# Patient Record
Sex: MALE | Race: WHITE | NOT HISPANIC OR LATINO | URBAN - METROPOLITAN AREA
[De-identification: names, ages, dates, MRNs, and addresses within clinical notes are randomized per-mention and may not be internally consistent; named-entity substitution may affect disease eponyms.]

---

## 2017-02-21 ENCOUNTER — EMERGENCY (EMERGENCY)
Facility: HOSPITAL | Age: 57
LOS: 1 days | Discharge: HOME | End: 2017-02-21
Attending: EMERGENCY MEDICINE | Admitting: EMERGENCY MEDICINE
Payer: COMMERCIAL

## 2017-02-21 VITALS
TEMPERATURE: 98 F | RESPIRATION RATE: 18 BRPM | WEIGHT: 154.98 LBS | SYSTOLIC BLOOD PRESSURE: 149 MMHG | HEART RATE: 75 BPM | DIASTOLIC BLOOD PRESSURE: 79 MMHG | OXYGEN SATURATION: 99 %

## 2017-02-21 DIAGNOSIS — R11.2 NAUSEA WITH VOMITING, UNSPECIFIED: ICD-10-CM

## 2017-02-21 DIAGNOSIS — R05 COUGH: ICD-10-CM

## 2017-02-21 LAB
ALBUMIN SERPL ELPH-MCNC: 3.9 G/DL — SIGNIFICANT CHANGE UP (ref 3.4–5)
ALP SERPL-CCNC: 42 U/L — SIGNIFICANT CHANGE UP (ref 40–120)
ALT FLD-CCNC: 21 U/L — SIGNIFICANT CHANGE UP (ref 12–42)
ANION GAP SERPL CALC-SCNC: 10 MMOL/L — SIGNIFICANT CHANGE UP (ref 9–16)
APPEARANCE UR: CLEAR — SIGNIFICANT CHANGE UP
AST SERPL-CCNC: 27 U/L — SIGNIFICANT CHANGE UP (ref 15–37)
BILIRUB SERPL-MCNC: 0.5 MG/DL — SIGNIFICANT CHANGE UP (ref 0.2–1.2)
BILIRUB UR-MCNC: NEGATIVE — SIGNIFICANT CHANGE UP
BUN SERPL-MCNC: 20 MG/DL — SIGNIFICANT CHANGE UP (ref 7–23)
CALCIUM SERPL-MCNC: 8.9 MG/DL — SIGNIFICANT CHANGE UP (ref 8.5–10.5)
CHLORIDE SERPL-SCNC: 99 MMOL/L — SIGNIFICANT CHANGE UP (ref 96–108)
CO2 SERPL-SCNC: 25 MMOL/L — SIGNIFICANT CHANGE UP (ref 22–31)
COLOR SPEC: YELLOW — SIGNIFICANT CHANGE UP
CREAT SERPL-MCNC: 0.95 MG/DL — SIGNIFICANT CHANGE UP (ref 0.5–1.3)
DIFF PNL FLD: NEGATIVE — SIGNIFICANT CHANGE UP
FLUAV SPEC QL CULT: NEGATIVE — SIGNIFICANT CHANGE UP
FLUBV AG SPEC QL IA: NEGATIVE — SIGNIFICANT CHANGE UP
GLUCOSE SERPL-MCNC: 109 MG/DL — HIGH (ref 70–99)
GLUCOSE UR QL: NEGATIVE — SIGNIFICANT CHANGE UP
HCT VFR BLD CALC: 38.6 % — LOW (ref 39–50)
HGB BLD-MCNC: 13.6 G/DL — SIGNIFICANT CHANGE UP (ref 13–17)
KETONES UR-MCNC: 15 MG/DL
LEUKOCYTE ESTERASE UR-ACNC: NEGATIVE — SIGNIFICANT CHANGE UP
LIDOCAIN IGE QN: 108 U/L — SIGNIFICANT CHANGE UP (ref 73–393)
MCHC RBC-ENTMCNC: 32.3 PG — SIGNIFICANT CHANGE UP (ref 27–34)
MCHC RBC-ENTMCNC: 35.2 G/DL — SIGNIFICANT CHANGE UP (ref 32–36)
MCV RBC AUTO: 91.7 FL — SIGNIFICANT CHANGE UP (ref 80–100)
NITRITE UR-MCNC: NEGATIVE — SIGNIFICANT CHANGE UP
PH UR: 5.5 — SIGNIFICANT CHANGE UP (ref 4–8.1)
PLATELET # BLD AUTO: 229 K/UL — SIGNIFICANT CHANGE UP (ref 150–400)
POTASSIUM SERPL-MCNC: 3.4 MMOL/L — LOW (ref 3.5–5.3)
POTASSIUM SERPL-SCNC: 3.4 MMOL/L — LOW (ref 3.5–5.3)
PROT SERPL-MCNC: 7.1 G/DL — SIGNIFICANT CHANGE UP (ref 6.4–8.2)
PROT UR-MCNC: NEGATIVE MG/DL — SIGNIFICANT CHANGE UP
RBC # BLD: 4.21 M/UL — SIGNIFICANT CHANGE UP (ref 4.2–5.8)
RBC # FLD: 11.7 % — SIGNIFICANT CHANGE UP (ref 10.3–16.9)
SODIUM SERPL-SCNC: 134 MMOL/L — SIGNIFICANT CHANGE UP (ref 132–145)
SP GR SPEC: 1.02 — SIGNIFICANT CHANGE UP (ref 1–1.03)
UROBILINOGEN FLD QL: 0.2 E.U./DL — SIGNIFICANT CHANGE UP
WBC # BLD: 10.5 K/UL — SIGNIFICANT CHANGE UP (ref 3.8–10.5)
WBC # FLD AUTO: 10.5 K/UL — SIGNIFICANT CHANGE UP (ref 3.8–10.5)

## 2017-02-21 PROCEDURE — 71020: CPT | Mod: 26

## 2017-02-21 PROCEDURE — 99284 EMERGENCY DEPT VISIT MOD MDM: CPT

## 2017-02-21 RX ORDER — ONDANSETRON 8 MG/1
4 TABLET, FILM COATED ORAL ONCE
Qty: 0 | Refills: 0 | Status: COMPLETED | OUTPATIENT
Start: 2017-02-21 | End: 2017-02-21

## 2017-02-21 RX ORDER — ACETAMINOPHEN 500 MG
650 TABLET ORAL ONCE
Qty: 0 | Refills: 0 | Status: COMPLETED | OUTPATIENT
Start: 2017-02-21 | End: 2017-02-21

## 2017-02-21 RX ORDER — SODIUM CHLORIDE 9 MG/ML
2000 INJECTION INTRAMUSCULAR; INTRAVENOUS; SUBCUTANEOUS ONCE
Qty: 0 | Refills: 0 | Status: COMPLETED | OUTPATIENT
Start: 2017-02-21 | End: 2017-02-21

## 2017-02-21 RX ADMIN — SODIUM CHLORIDE 2000 MILLILITER(S): 9 INJECTION INTRAMUSCULAR; INTRAVENOUS; SUBCUTANEOUS at 21:29

## 2017-02-21 RX ADMIN — ONDANSETRON 4 MILLIGRAM(S): 8 TABLET, FILM COATED ORAL at 21:27

## 2017-02-21 RX ADMIN — Medication 650 MILLIGRAM(S): at 23:46

## 2017-02-21 NOTE — ED ADULT NURSE NOTE - OBJECTIVE STATEMENT
pt is a 56y male, complaining of nausea and vomiting which started at 4pm today. pt is a 56y male, complaining of nausea and vomiting which started at 4pm today. Pt states 'I cannot keep anything down." Denies stomach pain, diarrhea, difficulty urinating and fever. Pt added, "I just feel dehydrated." No other complaints.

## 2017-02-21 NOTE — ED ADULT NURSE NOTE - CHPI ED SYMPTOMS NEG
no abdominal distension/no chills/no burning urination/no dysuria/no hematuria/no blood in stool/no diarrhea/no fever

## 2017-02-21 NOTE — ED PROVIDER NOTE - PROGRESS NOTE DETAILS
patient feeling better, tolerating PO, abdomen soft nontender, hydrated, urine with ketones, otherwise labs unremarkable, probable viral syndrome, will rx zofran prn, f/u PMD, strict return precautions reviewed.

## 2017-02-21 NOTE — ED ADULT TRIAGE NOTE - CHIEF COMPLAINT QUOTE
here for chills, nausea, vomiting and headache since 4pm today- pt also c/o cough, productive clear phlegm-

## 2017-02-21 NOTE — ED PROVIDER NOTE - OBJECTIVE STATEMENT
57 yo M with no pmhx presents c/o resolving "cold" - nonproductive cough, rhinorrhea x 1 week, had 3 episodes of vomiting today, c/o feeling "dehydrated" today. He denies abdominal pain, diarrhea. No fever, no chills. No chest pain or sob.

## 2017-02-21 NOTE — ED PROVIDER NOTE - MEDICAL DECISION MAKING DETAILS
vomiting with uri sx, nml vitals, unremarkable exam, will check labs, antiemetics, iv fluids, reassess.

## 2017-02-22 RX ORDER — POTASSIUM CHLORIDE 20 MEQ
20 PACKET (EA) ORAL ONCE
Qty: 0 | Refills: 0 | Status: COMPLETED | OUTPATIENT
Start: 2017-02-22 | End: 2017-02-22

## 2017-02-22 RX ORDER — ONDANSETRON 8 MG/1
1 TABLET, FILM COATED ORAL
Qty: 28 | Refills: 0
Start: 2017-02-22 | End: 2017-03-01

## 2017-02-22 RX ADMIN — Medication 650 MILLIGRAM(S): at 00:58

## 2017-02-22 RX ADMIN — Medication 20 MILLIEQUIVALENT(S): at 00:58

## 2019-04-15 ENCOUNTER — EMERGENCY (EMERGENCY)
Facility: HOSPITAL | Age: 59
LOS: 1 days | Discharge: HOME | End: 2019-04-15
Attending: EMERGENCY MEDICINE | Admitting: EMERGENCY MEDICINE
Payer: COMMERCIAL

## 2019-04-15 VITALS
DIASTOLIC BLOOD PRESSURE: 6 MMHG | OXYGEN SATURATION: 99 % | TEMPERATURE: 99 F | SYSTOLIC BLOOD PRESSURE: 125 MMHG | HEART RATE: 72 BPM | RESPIRATION RATE: 14 BRPM

## 2019-04-15 VITALS
DIASTOLIC BLOOD PRESSURE: 63 MMHG | TEMPERATURE: 99 F | OXYGEN SATURATION: 100 % | HEART RATE: 170 BPM | WEIGHT: 154.98 LBS | SYSTOLIC BLOOD PRESSURE: 103 MMHG | HEIGHT: 73 IN | RESPIRATION RATE: 18 BRPM

## 2019-04-15 DIAGNOSIS — I47.1 SUPRAVENTRICULAR TACHYCARDIA: ICD-10-CM

## 2019-04-15 DIAGNOSIS — Z79.899 OTHER LONG TERM (CURRENT) DRUG THERAPY: ICD-10-CM

## 2019-04-15 DIAGNOSIS — R00.2 PALPITATIONS: ICD-10-CM

## 2019-04-15 PROBLEM — Z00.00 ENCOUNTER FOR PREVENTIVE HEALTH EXAMINATION: Status: ACTIVE | Noted: 2019-04-15

## 2019-04-15 LAB
ALBUMIN SERPL ELPH-MCNC: 4.4 G/DL — SIGNIFICANT CHANGE UP (ref 3.4–5)
ALP SERPL-CCNC: 51 U/L — SIGNIFICANT CHANGE UP (ref 40–120)
ALT FLD-CCNC: 25 U/L — SIGNIFICANT CHANGE UP (ref 12–42)
ANION GAP SERPL CALC-SCNC: 9 MMOL/L — SIGNIFICANT CHANGE UP (ref 9–16)
APTT BLD: 31.3 SEC — SIGNIFICANT CHANGE UP (ref 27.5–36.3)
AST SERPL-CCNC: 31 U/L — SIGNIFICANT CHANGE UP (ref 15–37)
BASOPHILS NFR BLD AUTO: 0.3 % — SIGNIFICANT CHANGE UP (ref 0–2)
BILIRUB SERPL-MCNC: 0.9 MG/DL — SIGNIFICANT CHANGE UP (ref 0.2–1.2)
BUN SERPL-MCNC: 14 MG/DL — SIGNIFICANT CHANGE UP (ref 7–23)
CALCIUM SERPL-MCNC: 9.3 MG/DL — SIGNIFICANT CHANGE UP (ref 8.5–10.5)
CHLORIDE SERPL-SCNC: 100 MMOL/L — SIGNIFICANT CHANGE UP (ref 96–108)
CO2 SERPL-SCNC: 26 MMOL/L — SIGNIFICANT CHANGE UP (ref 22–31)
CREAT SERPL-MCNC: 1.12 MG/DL — SIGNIFICANT CHANGE UP (ref 0.5–1.3)
EOSINOPHIL NFR BLD AUTO: 0.5 % — SIGNIFICANT CHANGE UP (ref 0–6)
GLUCOSE SERPL-MCNC: 88 MG/DL — SIGNIFICANT CHANGE UP (ref 70–99)
HCT VFR BLD CALC: 44.5 % — SIGNIFICANT CHANGE UP (ref 39–50)
HGB BLD-MCNC: 15.2 G/DL — SIGNIFICANT CHANGE UP (ref 13–17)
IMM GRANULOCYTES NFR BLD AUTO: 0.2 % — SIGNIFICANT CHANGE UP (ref 0–1.5)
INR BLD: 1.15 — SIGNIFICANT CHANGE UP (ref 0.88–1.16)
LYMPHOCYTES # BLD AUTO: 21.3 % — SIGNIFICANT CHANGE UP (ref 13–44)
MAGNESIUM SERPL-MCNC: 2 MG/DL — SIGNIFICANT CHANGE UP (ref 1.6–2.6)
MCHC RBC-ENTMCNC: 32 PG — SIGNIFICANT CHANGE UP (ref 27–34)
MCHC RBC-ENTMCNC: 34.2 G/DL — SIGNIFICANT CHANGE UP (ref 32–36)
MCV RBC AUTO: 93.7 FL — SIGNIFICANT CHANGE UP (ref 80–100)
MONOCYTES NFR BLD AUTO: 6.7 % — SIGNIFICANT CHANGE UP (ref 2–14)
NEUTROPHILS NFR BLD AUTO: 71 % — SIGNIFICANT CHANGE UP (ref 43–77)
NT-PROBNP SERPL-SCNC: 67 PG/ML — SIGNIFICANT CHANGE UP
PHOSPHATE SERPL-MCNC: 2.7 MG/DL — SIGNIFICANT CHANGE UP (ref 2.5–4.5)
PLATELET # BLD AUTO: 198 K/UL — SIGNIFICANT CHANGE UP (ref 150–400)
POTASSIUM SERPL-MCNC: 4 MMOL/L — SIGNIFICANT CHANGE UP (ref 3.5–5.3)
POTASSIUM SERPL-SCNC: 4 MMOL/L — SIGNIFICANT CHANGE UP (ref 3.5–5.3)
PROT SERPL-MCNC: 7.6 G/DL — SIGNIFICANT CHANGE UP (ref 6.4–8.2)
PROTHROM AB SERPL-ACNC: 12.7 SEC — SIGNIFICANT CHANGE UP (ref 10–12.9)
RBC # BLD: 4.75 M/UL — SIGNIFICANT CHANGE UP (ref 4.2–5.8)
RBC # FLD: 12.4 % — SIGNIFICANT CHANGE UP (ref 10.3–14.5)
SODIUM SERPL-SCNC: 135 MMOL/L — SIGNIFICANT CHANGE UP (ref 132–145)
TROPONIN I SERPL-MCNC: <0.017 NG/ML — LOW (ref 0.02–0.06)
TSH SERPL-MCNC: 3.19 UIU/ML — SIGNIFICANT CHANGE UP (ref 0.36–3.74)
WBC # BLD: 9.4 K/UL — SIGNIFICANT CHANGE UP (ref 3.8–10.5)
WBC # FLD AUTO: 9.4 K/UL — SIGNIFICANT CHANGE UP (ref 3.8–10.5)

## 2019-04-15 PROCEDURE — 71045 X-RAY EXAM CHEST 1 VIEW: CPT | Mod: 26

## 2019-04-15 PROCEDURE — 99291 CRITICAL CARE FIRST HOUR: CPT

## 2019-04-15 PROCEDURE — 93010 ELECTROCARDIOGRAM REPORT: CPT | Mod: 59

## 2019-04-15 RX ORDER — SODIUM CHLORIDE 9 MG/ML
1000 INJECTION INTRAMUSCULAR; INTRAVENOUS; SUBCUTANEOUS ONCE
Qty: 0 | Refills: 0 | Status: COMPLETED | OUTPATIENT
Start: 2019-04-15 | End: 2019-04-15

## 2019-04-15 RX ORDER — ADENOSINE 3 MG/ML
6 INJECTION INTRAVENOUS ONCE
Qty: 0 | Refills: 0 | Status: COMPLETED | OUTPATIENT
Start: 2019-04-15 | End: 2019-04-15

## 2019-04-15 RX ADMIN — ADENOSINE 6 MILLIGRAM(S): 3 INJECTION INTRAVENOUS at 12:45

## 2019-04-15 RX ADMIN — SODIUM CHLORIDE 1000 MILLILITER(S): 9 INJECTION INTRAMUSCULAR; INTRAVENOUS; SUBCUTANEOUS at 13:26

## 2019-04-15 NOTE — ED ADULT TRIAGE NOTE - CHIEF COMPLAINT QUOTE
c/o sudden onset of palpitations and chest pressure x 1.5 hours after 30 min of cardio. denies CP, numbness/tinging. denies PMHX or cardiac hx. EKG in progress. c/o sudden onset of palpitations and chest pressure x 1.5 hours after 30 min of cardio at gym. denies CP, numbness/tinging. denies PMHX or cardiac hx. EKG in progress.

## 2019-04-15 NOTE — ED ADULT NURSE NOTE - CHIEF COMPLAINT QUOTE
c/o sudden onset of palpitations and chest pressure x 1.5 hours after 30 min of cardio at gym. denies CP, numbness/tinging. denies PMHX or cardiac hx. EKG in progress.

## 2019-04-15 NOTE — ED PROVIDER NOTE - PROGRESS NOTE DETAILS
Pt was given Adenosine, which brought HR down to 95. Currently, pt's heart rate has been at high 70's and low 80's. Denies any discomfort. Pt was seen by Cardiologist, Dr. Cameron Middleton and will follow up outpatient tomorrow.

## 2019-04-15 NOTE — ED PROVIDER NOTE - OBJECTIVE STATEMENT
57yo m w/ no significant pmhx presents with palpitations, HR 170s, which started about 1 hours ago. Pt was working out this morning and symptoms started about 30mns after workout. Denies any chest pain, fever, chills, nausea vomiting, no recent travel anywhere, no calf pain or swelling, no hx of DVT. pt denies any hx of SVT, or any other arrhythmia, no cardiac hx.

## 2019-04-15 NOTE — ED PROVIDER NOTE - NSFOLLOWUPINSTRUCTIONS_ED_ALL_ED_FT
Please follow up with cardiologist, Dr. Cameron Middleton tomorrow, as instructed  Please return to the ER for any new or worsening symptoms.

## 2019-04-15 NOTE — ED ADULT NURSE NOTE - NSIMPLEMENTINTERV_GEN_ALL_ED
Implemented All Universal Safety Interventions:  [place] to call system. Call bell, personal items and telephone within reach. Instruct patient to call for assistance. Room bathroom lighting operational. Non-slip footwear when patient is off stretcher. Physically safe environment: no spills, clutter or unnecessary equipment. Stretcher in lowest position, wheels locked, appropriate side rails in place.

## 2019-04-15 NOTE — ED PROVIDER NOTE - CLINICAL SUMMARY MEDICAL DECISION MAKING FREE TEXT BOX
59yo m w/ no significant pmhx presents with palpitations, HR 170s, which started about 1 hours ago. Pt was working out this morning and symptoms started about 30mns after workout. Denies any chest pain, fever, chills, nausea vomiting, no recent travel anywhere, no calf pain or swelling, no hx of DVT. pt denies any hx of SVT, or any other arrhythmia, no cardiac hx. PE was remarkable for HR in the 170's  - Adenosine, EKG, CXR and labs

## 2019-04-15 NOTE — ED PROVIDER NOTE - ATTENDING CONTRIBUTION TO CARE
Patient presenting with new diagnosis of SVT. Given 6mg IV adenosine, converted to NSR, normal rate. Patient with negative work up. Seen by Cardiology. Patient safe for discharge, follow up outpatient with Dr. CONNER belcher

## 2019-04-15 NOTE — ED PROVIDER NOTE - CRITICAL CARE PROVIDED
documentation/additional history taking/direct patient care (not related to procedure)/interpretation of diagnostic studies/consultation with other physicians/conducted a detailed discussion of DNR status

## 2019-04-16 ENCOUNTER — APPOINTMENT (OUTPATIENT)
Dept: HEART AND VASCULAR | Facility: CLINIC | Age: 59
End: 2019-04-16
Payer: COMMERCIAL

## 2019-04-16 VITALS
WEIGHT: 156 LBS | HEART RATE: 64 BPM | OXYGEN SATURATION: 96 % | DIASTOLIC BLOOD PRESSURE: 75 MMHG | SYSTOLIC BLOOD PRESSURE: 129 MMHG | HEIGHT: 71.5 IN | BODY MASS INDEX: 21.36 KG/M2 | TEMPERATURE: 97.8 F

## 2019-04-16 DIAGNOSIS — Z78.9 OTHER SPECIFIED HEALTH STATUS: ICD-10-CM

## 2019-04-16 DIAGNOSIS — Z82.49 FAMILY HISTORY OF ISCHEMIC HEART DISEASE AND OTHER DISEASES OF THE CIRCULATORY SYSTEM: ICD-10-CM

## 2019-04-16 PROCEDURE — 99204 OFFICE O/P NEW MOD 45 MIN: CPT

## 2019-04-16 RX ORDER — LORAZEPAM 1 MG/1
1 TABLET ORAL
Qty: 60 | Refills: 0 | Status: ACTIVE | COMMUNITY
Start: 2019-02-08

## 2019-04-16 RX ORDER — FINASTERIDE 5 MG/1
5 TABLET, FILM COATED ORAL
Qty: 30 | Refills: 0 | Status: ACTIVE | COMMUNITY
Start: 2018-09-26

## 2019-04-16 NOTE — REASON FOR VISIT
[Initial Evaluation] : an initial evaluation of [Supraventricular Tachycardia] : supraventricular tachycardia [FreeTextEntry1] : 59yo m w/ no significant pmhx presents with palpitations, HR 170s, which started about 1 hours prior to presentation. Pt was working out as he usually does in the morning and symptoms started about 30mns after workout. Denies any chest pain fever, chills, nausea vomiting, no recent travel anywhere, no calf pain or welling, no hx of DVT. pt denies any hx of SVT, or any other arrhythmia, no\par cardiac hx.\par \par SVT noted on ekg. Terminated with adenosine 6 mg x 1. Doing better today.

## 2019-04-24 ENCOUNTER — APPOINTMENT (OUTPATIENT)
Dept: HEART AND VASCULAR | Facility: CLINIC | Age: 59
End: 2019-04-24
Payer: COMMERCIAL

## 2019-04-24 PROCEDURE — 93306 TTE W/DOPPLER COMPLETE: CPT

## 2019-04-25 ENCOUNTER — APPOINTMENT (OUTPATIENT)
Dept: HEART AND VASCULAR | Facility: CLINIC | Age: 59
End: 2019-04-25
Payer: COMMERCIAL

## 2019-04-25 ENCOUNTER — NON-APPOINTMENT (OUTPATIENT)
Age: 59
End: 2019-04-25

## 2019-04-25 VITALS
SYSTOLIC BLOOD PRESSURE: 121 MMHG | DIASTOLIC BLOOD PRESSURE: 53 MMHG | WEIGHT: 156 LBS | HEIGHT: 71 IN | HEART RATE: 73 BPM | BODY MASS INDEX: 21.84 KG/M2

## 2019-04-25 DIAGNOSIS — J30.2 OTHER SEASONAL ALLERGIC RHINITIS: ICD-10-CM

## 2019-04-25 PROCEDURE — 99205 OFFICE O/P NEW HI 60 MIN: CPT | Mod: 25

## 2019-04-25 PROCEDURE — 93000 ELECTROCARDIOGRAM COMPLETE: CPT

## 2019-04-25 NOTE — PHYSICAL EXAM
[Normal Appearance] : normal appearance [General Appearance - Well Developed] : well developed [Well Groomed] : well groomed [General Appearance - Well Nourished] : well nourished [No Deformities] : no deformities [General Appearance - In No Acute Distress] : no acute distress [Normal Conjunctiva] : the conjunctiva exhibited no abnormalities [Eyelids - No Xanthelasma] : the eyelids demonstrated no xanthelasmas [Normal Oral Mucosa] : normal oral mucosa [No Oral Cyanosis] : no oral cyanosis [No Oral Pallor] : no oral pallor [Normal Jugular Venous A Waves Present] : normal jugular venous A waves present [Normal Jugular Venous V Waves Present] : normal jugular venous V waves present [No Jugular Venous Alarcon A Waves] : no jugular venous alarcon A waves [Heart Rate And Rhythm] : heart rate and rhythm were normal [Murmurs] : no murmurs present [Heart Sounds] : normal S1 and S2 [Arterial Pulses Normal] : the arterial pulses were normal [Exaggerated Use Of Accessory Muscles For Inspiration] : no accessory muscle use [Respiration, Rhythm And Depth] : normal respiratory rhythm and effort [Auscultation Breath Sounds / Voice Sounds] : lungs were clear to auscultation bilaterally [Abdomen Tenderness] : non-tender [Abdomen Soft] : soft [Abdomen Mass (___ Cm)] : no abdominal mass palpated [Abnormal Walk] : normal gait [Gait - Sufficient For Exercise Testing] : the gait was sufficient for exercise testing [] : no rash [No Venous Stasis] : no venous stasis [Skin Color & Pigmentation] : normal skin color and pigmentation [Skin Lesions] : no skin lesions [No Skin Ulcers] : no skin ulcer [No Xanthoma] : no  xanthoma was observed [Oriented To Time, Place, And Person] : oriented to person, place, and time [Affect] : the affect was normal [Mood] : the mood was normal [No Anxiety] : not feeling anxious

## 2019-04-25 NOTE — REASON FOR VISIT
[Initial Evaluation] : an initial evaluation of [Supraventricular Tachycardia] : supraventricular tachycardia [FreeTextEntry1] : 58 y.o. M with no significant medical problems who presented to [facility] 4/15/19 with palpitations and was found to have adenosine sensitive SVT who presents for an initial evaluation.\par \par Pt reports that this was his first ever episode of palpitations. It began shortly after completing a work-out. He went back to work, noted rapid heart beats that persisted for 20 min and called a cab to take him to the ED. Upon arrival to the ED he was found to have SVT at 165 bpm. Denies associated sob, c/p, lightheadedness and diaphoresis. He received 6mg adenosine which terminated the rhythm.\par \par Of note, patient planning to leave for a solo bicycle trip next week in [place]. \par \par Labs from ED normal. Normal electrolytes, TSH, no troponin leak. 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 \par \par Echo normal (report from Dr. Middleton pending)     \par \par  \par \par

## 2019-04-29 ENCOUNTER — APPOINTMENT (OUTPATIENT)
Dept: HEART AND VASCULAR | Facility: CLINIC | Age: 59
End: 2019-04-29
Payer: COMMERCIAL

## 2019-04-29 VITALS
SYSTOLIC BLOOD PRESSURE: 114 MMHG | BODY MASS INDEX: 21.84 KG/M2 | DIASTOLIC BLOOD PRESSURE: 64 MMHG | WEIGHT: 156 LBS | HEIGHT: 71 IN | OXYGEN SATURATION: 98 % | HEART RATE: 75 BPM

## 2019-04-29 PROCEDURE — 99214 OFFICE O/P EST MOD 30 MIN: CPT

## 2019-04-29 NOTE — PHYSICAL EXAM
[General Appearance - Well Developed] : well developed [Normal Appearance] : normal appearance [Well Groomed] : well groomed [General Appearance - Well Nourished] : well nourished [No Deformities] : no deformities [General Appearance - In No Acute Distress] : no acute distress [Normal Conjunctiva] : the conjunctiva exhibited no abnormalities [Eyelids - No Xanthelasma] : the eyelids demonstrated no xanthelasmas [Normal Oral Mucosa] : normal oral mucosa [No Oral Pallor] : no oral pallor [No Oral Cyanosis] : no oral cyanosis [Normal Jugular Venous A Waves Present] : normal jugular venous A waves present [Normal Jugular Venous V Waves Present] : normal jugular venous V waves present [No Jugular Venous Alarcon A Waves] : no jugular venous alarcon A waves [Respiration, Rhythm And Depth] : normal respiratory rhythm and effort [Exaggerated Use Of Accessory Muscles For Inspiration] : no accessory muscle use [Auscultation Breath Sounds / Voice Sounds] : lungs were clear to auscultation bilaterally [Abdomen Soft] : soft [Abdomen Tenderness] : non-tender [Abdomen Mass (___ Cm)] : no abdominal mass palpated [Abnormal Walk] : normal gait [Gait - Sufficient For Exercise Testing] : the gait was sufficient for exercise testing [Nail Clubbing] : no clubbing of the fingernails [Cyanosis, Localized] : no localized cyanosis [Petechial Hemorrhages (___cm)] : no petechial hemorrhages [Skin Color & Pigmentation] : normal skin color and pigmentation [] : no rash [No Venous Stasis] : no venous stasis [Skin Lesions] : no skin lesions [No Skin Ulcers] : no skin ulcer [No Xanthoma] : no  xanthoma was observed [Oriented To Time, Place, And Person] : oriented to person, place, and time [Affect] : the affect was normal [Mood] : the mood was normal [No Anxiety] : not feeling anxious

## 2019-04-29 NOTE — DISCUSSION/SUMMARY
[FreeTextEntry1] : ordering a stress echo as part of evaluation for his SVT. THe goal is to further risk stratify him and clear him for upcoming travel

## 2019-04-29 NOTE — REASON FOR VISIT
[Follow-Up - Clinic] : a clinic follow-up of [Supraventricular Tachycardia] : supraventricular tachycardia [FreeTextEntry1] : Mr. IVORY presents for a follow up today. He denies chest pain, dyspnea or palpitations. No issues reported with his medications. Otherwise, he is feeling well.\par \par Scheduled to travel. Was seen by EP. Tentative procedure sometime this summer for SVT evaluation.

## 2019-05-20 ENCOUNTER — APPOINTMENT (OUTPATIENT)
Dept: HEART AND VASCULAR | Facility: CLINIC | Age: 59
End: 2019-05-20
Payer: COMMERCIAL

## 2019-05-20 PROCEDURE — 93015 CV STRESS TEST SUPVJ I&R: CPT

## 2019-06-04 ENCOUNTER — NON-APPOINTMENT (OUTPATIENT)
Age: 59
End: 2019-06-04

## 2019-06-04 ENCOUNTER — APPOINTMENT (OUTPATIENT)
Dept: HEART AND VASCULAR | Facility: CLINIC | Age: 59
End: 2019-06-04
Payer: COMMERCIAL

## 2019-06-04 VITALS — HEART RATE: 63 BPM | SYSTOLIC BLOOD PRESSURE: 144 MMHG | DIASTOLIC BLOOD PRESSURE: 66 MMHG

## 2019-06-04 PROCEDURE — 93000 ELECTROCARDIOGRAM COMPLETE: CPT

## 2019-06-04 PROCEDURE — 99214 OFFICE O/P EST MOD 30 MIN: CPT | Mod: 25

## 2019-06-05 NOTE — PHYSICAL EXAM
[General Appearance - Well Developed] : well developed [Normal Appearance] : normal appearance [Well Groomed] : well groomed [General Appearance - Well Nourished] : well nourished [No Deformities] : no deformities [General Appearance - In No Acute Distress] : no acute distress [Normal Conjunctiva] : the conjunctiva exhibited no abnormalities [Eyelids - No Xanthelasma] : the eyelids demonstrated no xanthelasmas [Normal Oral Mucosa] : normal oral mucosa [No Oral Pallor] : no oral pallor [No Oral Cyanosis] : no oral cyanosis [Normal Jugular Venous A Waves Present] : normal jugular venous A waves present [Normal Jugular Venous V Waves Present] : normal jugular venous V waves present [No Jugular Venous Alarcon A Waves] : no jugular venous alarcon A waves [Respiration, Rhythm And Depth] : normal respiratory rhythm and effort [Exaggerated Use Of Accessory Muscles For Inspiration] : no accessory muscle use [Auscultation Breath Sounds / Voice Sounds] : lungs were clear to auscultation bilaterally [Heart Rate And Rhythm] : heart rate and rhythm were normal [Heart Sounds] : normal S1 and S2 [Murmurs] : no murmurs present [Arterial Pulses Normal] : the arterial pulses were normal [Abdomen Soft] : soft [Abdomen Tenderness] : non-tender [Abdomen Mass (___ Cm)] : no abdominal mass palpated [Abnormal Walk] : normal gait [Gait - Sufficient For Exercise Testing] : the gait was sufficient for exercise testing [Skin Color & Pigmentation] : normal skin color and pigmentation [] : no rash [No Venous Stasis] : no venous stasis [Skin Lesions] : no skin lesions [No Skin Ulcers] : no skin ulcer [No Xanthoma] : no  xanthoma was observed [Oriented To Time, Place, And Person] : oriented to person, place, and time [Affect] : the affect was normal [Mood] : the mood was normal [No Anxiety] : not feeling anxious

## 2019-06-05 NOTE — REASON FOR VISIT
[Follow-Up - Clinic] : a clinic follow-up of [Supraventricular Tachycardia] : supraventricular tachycardia [FreeTextEntry1] : 58 y.o. M with no significant medical problems who presented to [facility] 4/15/19 with palpitations and was found to have adenosine sensitive SVT.  \par \par Pt reports that this was his first ever episode of palpitations. It began shortly after completing a work-out. He went back to work, noted rapid heart beats that persisted for 20 min and called a cab to take him to the ED. Upon arrival to the ED he was found to have SVT at 165 bpm. Denies associated sob, c/p, lightheadedness and diaphoresis. He received 6mg adenosine which terminated the rhythm.  Labs from ED normal. Normal electrolytes, TSH, no troponin leak.  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  Echo normal.\par \par  He presents today to discuss the merits of catheter based ablation.  He denies any recurrent events.

## 2019-06-17 ENCOUNTER — OUTPATIENT (OUTPATIENT)
Dept: OUTPATIENT SERVICES | Facility: HOSPITAL | Age: 59
LOS: 1 days | Discharge: HOME | End: 2019-06-17
Payer: COMMERCIAL

## 2019-06-17 DIAGNOSIS — I47.1 SUPRAVENTRICULAR TACHYCARDIA: ICD-10-CM

## 2019-06-17 PROCEDURE — 93613 INTRACARDIAC EPHYS 3D MAPG: CPT

## 2019-06-17 PROCEDURE — C1730: CPT

## 2019-06-17 PROCEDURE — C1893: CPT

## 2019-06-17 PROCEDURE — 93653 COMPRE EP EVAL TX SVT: CPT

## 2019-06-17 PROCEDURE — C1733: CPT

## 2019-06-17 PROCEDURE — 93623 PRGRMD STIMJ&PACG IV RX NFS: CPT | Mod: 26

## 2019-06-17 PROCEDURE — C1894: CPT

## 2019-06-17 PROCEDURE — 93621 COMP EP EVL L PAC&REC C SINS: CPT

## 2019-06-17 PROCEDURE — 93623 PRGRMD STIMJ&PACG IV RX NFS: CPT

## 2019-06-17 PROCEDURE — 93621 COMP EP EVL L PAC&REC C SINS: CPT | Mod: 26

## 2019-06-17 RX ORDER — FINASTERIDE 5 MG/1
1 TABLET, FILM COATED ORAL
Qty: 0 | Refills: 0 | DISCHARGE

## 2019-06-17 RX ORDER — LORATADINE 10 MG/1
10 TABLET ORAL ONCE
Refills: 0 | Status: DISCONTINUED | OUTPATIENT
Start: 2019-06-17 | End: 2019-06-17

## 2019-06-17 RX ORDER — FINASTERIDE 5 MG/1
5 TABLET, FILM COATED ORAL ONCE
Refills: 0 | Status: DISCONTINUED | OUTPATIENT
Start: 2019-06-17 | End: 2019-06-17

## 2019-06-17 RX ORDER — CETIRIZINE HYDROCHLORIDE 10 MG/1
1 TABLET ORAL
Qty: 0 | Refills: 0 | DISCHARGE

## 2019-06-17 NOTE — H&P ADULT - ASSESSMENT
57 yo male with pmh of SVT and seasonal allergies presents to [facility] for scheduled SVT ablation today.

## 2019-06-17 NOTE — H&P ADULT - PROBLEM SELECTOR PLAN 1
Admitted for scheduled SVT ablation today. Initially diagnosed on 4/15/19 and presented to [facility] with palpitations and was found to have adenosine sensitive SVT. EKG EB rate 58, incomplete RBBB.   -Consent signed and in chart  -pre/post orders placed  -BR x 5 hours   -Plan for discharge home tomorrow if medically stable  -F/u with Dr. Hamilton in 4 weeks post op

## 2019-06-17 NOTE — H&P ADULT - HISTORY OF PRESENT ILLNESS
59 yo male with pmh of SVT and seasonal allergies presents to [facility] for scheduled SVT ablation today. In April, pt was initially diagnosed with SVT and received Adenosine in the ED, which terminated the rhythm. 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 Pt states having a "racing heart" during that time. Denies chest pain, SOB, lightheadedness, syncope or diaphoresis. Pt has remained asymptomatic since the initial episode. Currently, denies cp, SOB, lightheadedness, dizziness, diaphoresis, or syncope.

## 2019-06-17 NOTE — H&P ADULT - NSHPPHYSICALEXAM_GEN_ALL_CORE
General: A/ox 3, No acute Distress  Neck: Supple, NO JVD  Cardiac: S1 S2, No M/R/G  Pulmonary: CTAB, Breathing unlabored, No Rhonchi/Rales/Wheezing  Abdomen: Soft, Non -tender, +BS x 4 quads  Extremities: No Rashes, No edema  Neuro: A/o x 3, No focal deficits

## 2019-06-17 NOTE — CHART NOTE - NSCHARTNOTEFT_GEN_A_CORE
Given s/o by EP team patient was suppose to arrive to [facility]. On sunrise patient was discharged and checked all [facility] rooms, did not find patient. Attempted to call EP lab with no answer. Called Cath lab holding - stated patient was not there. [facility] said they were called saying the patient was discharged. No discharge order placed and no discharge note written. Attempted to call patient's cell phone multiple times overnight with no answer. LINCOLN Ornelas informed and called the manager of EP and they said that the patient was discharged. Will call EP team and inform them in the AM.

## 2019-06-17 NOTE — H&P ADULT - NSHPLABSRESULTS_GEN_ALL_CORE
Echo: 4/24/19: Left ventricular ejection fraction, EF 55-60%; normal left ventricular size and wall thickness, with normal systolic and diastolic function. Structurally normal mitral valve, with normal leaflet excursion. Normal trileaflet aortic valve with normal opening.    EKG: SB rate 58, incomplete RBBB    Labs: 4/2019    Na 135  K 4.0  BUN/Cr 14/1.12  Ca 9.3  glucose 88  TSH 3.18  Mag 2.0    CBC:  WBC: 9.4  H/H 15.2/44.5  Plt 198    INR1.15  PT12.7  BNP 67  Trop <0.017

## 2019-06-18 PROBLEM — Z86.79 PERSONAL HISTORY OF OTHER DISEASES OF THE CIRCULATORY SYSTEM: Chronic | Status: ACTIVE | Noted: 2019-06-17

## 2019-06-18 PROBLEM — J30.2 OTHER SEASONAL ALLERGIC RHINITIS: Chronic | Status: ACTIVE | Noted: 2019-06-17

## 2019-06-19 ENCOUNTER — APPOINTMENT (OUTPATIENT)
Dept: HEART AND VASCULAR | Facility: CLINIC | Age: 59
End: 2019-06-19
Payer: COMMERCIAL

## 2019-06-19 ENCOUNTER — NON-APPOINTMENT (OUTPATIENT)
Age: 59
End: 2019-06-19

## 2019-06-19 VITALS
SYSTOLIC BLOOD PRESSURE: 129 MMHG | HEIGHT: 71 IN | WEIGHT: 158 LBS | OXYGEN SATURATION: 96 % | BODY MASS INDEX: 22.12 KG/M2 | DIASTOLIC BLOOD PRESSURE: 70 MMHG | HEART RATE: 61 BPM | TEMPERATURE: 97.8 F

## 2019-06-19 PROCEDURE — 99214 OFFICE O/P EST MOD 30 MIN: CPT

## 2019-06-19 NOTE — PHYSICAL EXAM
[General Appearance - Well Developed] : well developed [Normal Appearance] : normal appearance [Well Groomed] : well groomed [General Appearance - Well Nourished] : well nourished [No Deformities] : no deformities [General Appearance - In No Acute Distress] : no acute distress [Eyelids - No Xanthelasma] : the eyelids demonstrated no xanthelasmas [Normal Conjunctiva] : the conjunctiva exhibited no abnormalities [No Oral Pallor] : no oral pallor [Normal Oral Mucosa] : normal oral mucosa [No Oral Cyanosis] : no oral cyanosis [Normal Jugular Venous A Waves Present] : normal jugular venous A waves present [Normal Jugular Venous V Waves Present] : normal jugular venous V waves present [Respiration, Rhythm And Depth] : normal respiratory rhythm and effort [No Jugular Venous Alarcon A Waves] : no jugular venous alarcon A waves [Exaggerated Use Of Accessory Muscles For Inspiration] : no accessory muscle use [Auscultation Breath Sounds / Voice Sounds] : lungs were clear to auscultation bilaterally [Abdomen Tenderness] : non-tender [Abdomen Soft] : soft [Abdomen Mass (___ Cm)] : no abdominal mass palpated [Abnormal Walk] : normal gait [Gait - Sufficient For Exercise Testing] : the gait was sufficient for exercise testing [Nail Clubbing] : no clubbing of the fingernails [Cyanosis, Localized] : no localized cyanosis [Petechial Hemorrhages (___cm)] : no petechial hemorrhages [Skin Color & Pigmentation] : normal skin color and pigmentation [] : no rash [No Skin Ulcers] : no skin ulcer [No Venous Stasis] : no venous stasis [Skin Lesions] : no skin lesions [No Xanthoma] : no  xanthoma was observed [Oriented To Time, Place, And Person] : oriented to person, place, and time [Affect] : the affect was normal [No Anxiety] : not feeling anxious [Mood] : the mood was normal

## 2019-06-19 NOTE — REASON FOR VISIT
[Follow-Up - Clinic] : a clinic follow-up of [Palpitations] : palpitations [Supraventricular Tachycardia] : supraventricular tachycardia [FreeTextEntry1] : Mr. IVROY presents for a follow up today. He denies chest pain, dyspnea or palpitations. No issues reported with his medications. Otherwise, he is feeling well.\par \par Presents after SVT procedure with Dr Hamilton. Doing well.

## 2019-07-09 ENCOUNTER — NON-APPOINTMENT (OUTPATIENT)
Age: 59
End: 2019-07-09

## 2019-07-09 ENCOUNTER — APPOINTMENT (OUTPATIENT)
Dept: HEART AND VASCULAR | Facility: CLINIC | Age: 59
End: 2019-07-09
Payer: COMMERCIAL

## 2019-07-09 VITALS
BODY MASS INDEX: 22.12 KG/M2 | HEIGHT: 71 IN | WEIGHT: 158 LBS | SYSTOLIC BLOOD PRESSURE: 126 MMHG | HEART RATE: 70 BPM | DIASTOLIC BLOOD PRESSURE: 71 MMHG

## 2019-07-09 PROCEDURE — 93000 ELECTROCARDIOGRAM COMPLETE: CPT

## 2019-07-09 PROCEDURE — 99213 OFFICE O/P EST LOW 20 MIN: CPT

## 2019-07-09 NOTE — HISTORY OF PRESENT ILLNESS
[FreeTextEntry1] : 58 y.o. 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  He feels well and has not had recurrent symptoms.  He is exercising and doing things that previously "launched him" into arrhythmia.\par \par Groins ok.\par \par The patient denies chest pain, SOB, [place], palpitation, light headedness, syncope or change in exertional capacity.\par

## 2019-07-09 NOTE — PHYSICAL EXAM
[Arterial Pulses Normal] : the arterial pulses were normal [General Appearance - Well Developed] : well developed [Normal Appearance] : normal appearance [Well Groomed] : well groomed [General Appearance - Well Nourished] : well nourished [No Deformities] : no deformities [General Appearance - In No Acute Distress] : no acute distress [Normal Conjunctiva] : the conjunctiva exhibited no abnormalities [Eyelids - No Xanthelasma] : the eyelids demonstrated no xanthelasmas [Normal Oral Mucosa] : normal oral mucosa [No Oral Pallor] : no oral pallor [No Oral Cyanosis] : no oral cyanosis [Normal Jugular Venous A Waves Present] : normal jugular venous A waves present [Normal Jugular Venous V Waves Present] : normal jugular venous V waves present [No Jugular Venous Alarcon A Waves] : no jugular venous alarcon A waves [Respiration, Rhythm And Depth] : normal respiratory rhythm and effort [Exaggerated Use Of Accessory Muscles For Inspiration] : no accessory muscle use [Auscultation Breath Sounds / Voice Sounds] : lungs were clear to auscultation bilaterally [Heart Rate And Rhythm] : heart rate and rhythm were normal [Heart Sounds] : normal S1 and S2 [Murmurs] : no murmurs present [Abdomen Soft] : soft [Abdomen Tenderness] : non-tender [Abdomen Mass (___ Cm)] : no abdominal mass palpated [Abnormal Walk] : normal gait [Gait - Sufficient For Exercise Testing] : the gait was sufficient for exercise testing [Nail Clubbing] : no clubbing of the fingernails [Cyanosis, Localized] : no localized cyanosis [Petechial Hemorrhages (___cm)] : no petechial hemorrhages [Skin Color & Pigmentation] : normal skin color and pigmentation [] : no rash [No Venous Stasis] : no venous stasis [Skin Lesions] : no skin lesions [No Skin Ulcers] : no skin ulcer [No Xanthoma] : no  xanthoma was observed [Oriented To Time, Place, And Person] : oriented to person, place, and time [Affect] : the affect was normal [Mood] : the mood was normal [No Anxiety] : not feeling anxious

## 2019-09-12 ENCOUNTER — APPOINTMENT (OUTPATIENT)
Dept: HEART AND VASCULAR | Facility: CLINIC | Age: 59
End: 2019-09-12

## 2020-06-20 ENCOUNTER — TRANSCRIPTION ENCOUNTER (OUTPATIENT)
Age: 60
End: 2020-06-20

## 2020-06-22 ENCOUNTER — TRANSCRIPTION ENCOUNTER (OUTPATIENT)
Age: 60
End: 2020-06-22

## 2020-08-02 ENCOUNTER — TRANSCRIPTION ENCOUNTER (OUTPATIENT)
Age: 60
End: 2020-08-02

## 2020-09-02 ENCOUNTER — TRANSCRIPTION ENCOUNTER (OUTPATIENT)
Age: 60
End: 2020-09-02

## 2020-09-19 ENCOUNTER — TRANSCRIPTION ENCOUNTER (OUTPATIENT)
Age: 60
End: 2020-09-19

## 2020-10-05 ENCOUNTER — TRANSCRIPTION ENCOUNTER (OUTPATIENT)
Age: 60
End: 2020-10-05

## 2020-10-24 ENCOUNTER — TRANSCRIPTION ENCOUNTER (OUTPATIENT)
Age: 60
End: 2020-10-24

## 2020-11-18 ENCOUNTER — TRANSCRIPTION ENCOUNTER (OUTPATIENT)
Age: 60
End: 2020-11-18

## 2021-06-11 ENCOUNTER — TRANSCRIPTION ENCOUNTER (OUTPATIENT)
Age: 61
End: 2021-06-11

## 2021-10-19 ENCOUNTER — TRANSCRIPTION ENCOUNTER (OUTPATIENT)
Age: 61
End: 2021-10-19

## 2022-01-12 ENCOUNTER — APPOINTMENT (OUTPATIENT)
Dept: CT IMAGING | Facility: CLINIC | Age: 62
End: 2022-01-12
Payer: SELF-PAY

## 2022-01-12 ENCOUNTER — OUTPATIENT (OUTPATIENT)
Dept: OUTPATIENT SERVICES | Facility: HOSPITAL | Age: 62
LOS: 1 days | End: 2022-01-12

## 2022-01-12 PROCEDURE — 75571 CT HRT W/O DYE W/CA TEST: CPT | Mod: 26

## 2022-04-17 ENCOUNTER — NON-APPOINTMENT (OUTPATIENT)
Age: 62
End: 2022-04-17

## 2022-04-18 ENCOUNTER — NON-APPOINTMENT (OUTPATIENT)
Age: 62
End: 2022-04-18

## 2022-04-18 ENCOUNTER — APPOINTMENT (OUTPATIENT)
Dept: HEART AND VASCULAR | Facility: CLINIC | Age: 62
End: 2022-04-18
Payer: COMMERCIAL

## 2022-04-18 VITALS
HEIGHT: 73 IN | OXYGEN SATURATION: 99 % | DIASTOLIC BLOOD PRESSURE: 63 MMHG | SYSTOLIC BLOOD PRESSURE: 110 MMHG | BODY MASS INDEX: 20.41 KG/M2 | HEART RATE: 78 BPM | WEIGHT: 154 LBS

## 2022-04-18 PROCEDURE — 99214 OFFICE O/P EST MOD 30 MIN: CPT

## 2022-04-18 PROCEDURE — 93000 ELECTROCARDIOGRAM COMPLETE: CPT

## 2022-04-18 RX ORDER — CETIRIZINE HYDROCHLORIDE 10 MG/1
10 CAPSULE, LIQUID FILLED ORAL
Qty: 90 | Refills: 0 | Status: COMPLETED | COMMUNITY
Start: 2019-04-25 | End: 2022-04-18

## 2022-04-18 NOTE — DISCUSSION/SUMMARY
[FreeTextEntry1] : Palps ADAMS and I had a discussion of his symptoms. His risk for CAD falls intro intermediate. We will therefore move forward with a stress ekg and echocardiogram at this time to evaluate for obstructive CAD and risk stratification, provided an insurance approval can be obtained. My preference would be stress echocardiogram, but it is unlikely to be approved at this time. Risks and benefits discussed.\par Abn Ca score/HLD results reviewed will start ASA and low dose crestor. Scripts sent for these new issues. Risks and benefits discussed. \par EKG section of the chart --- secondary to symptoms above an electrocardiogram also known as an EKG was performed.  Risks and benefits discussed with the patient. Patient was given time and privacy to changed into a gown. Shortly after, standard 10 leads were applied and a [employer] [place] system was used to perform the study. The results were subsequently reviewed by attending physician and discussed with the patient. The study showed a normal sinus rhythm and no ST-T suggestive of ischemia. Order for the EKG was placed in the chart. The results were documented. Billing submitted. Emotional support provided.\par

## 2022-04-18 NOTE — REASON FOR VISIT
[Symptom and Test Evaluation] : symptom and test evaluation [FreeTextEntry1] : Mr. IVORY presents for a follow up today. He had symptoms of palpitations in the past s/p SVT ablation. He comes in with abnormal Ca score; we are discussing this new data.

## 2022-05-25 ENCOUNTER — APPOINTMENT (OUTPATIENT)
Dept: HEART AND VASCULAR | Facility: CLINIC | Age: 62
End: 2022-05-25
Payer: COMMERCIAL

## 2022-05-25 VITALS
OXYGEN SATURATION: 97 % | BODY MASS INDEX: 20.61 KG/M2 | WEIGHT: 155.5 LBS | DIASTOLIC BLOOD PRESSURE: 77 MMHG | SYSTOLIC BLOOD PRESSURE: 131 MMHG | TEMPERATURE: 97.8 F | HEIGHT: 73 IN | HEART RATE: 96 BPM

## 2022-05-25 DIAGNOSIS — Z86.79 OTHER SPECIFIED POSTPROCEDURAL STATES: ICD-10-CM

## 2022-05-25 DIAGNOSIS — Z98.890 OTHER SPECIFIED POSTPROCEDURAL STATES: ICD-10-CM

## 2022-05-25 PROCEDURE — 93015 CV STRESS TEST SUPVJ I&R: CPT

## 2022-05-25 PROCEDURE — 93306 TTE W/DOPPLER COMPLETE: CPT

## 2022-05-25 PROCEDURE — 99213 OFFICE O/P EST LOW 20 MIN: CPT | Mod: 25

## 2022-05-25 NOTE — REASON FOR VISIT
[Symptom and Test Evaluation] : symptom and test evaluation [FreeTextEntry1] : Mr. IVORY presents for a follow up today. He had symptoms of palpitations in the past s/p SVT ablation. He comes in with abnormal Ca score; we are discussing this new data. He had a stress ekg and an echocardiogram, as we are unable to approve a stress echo. We are discussing results.

## 2022-05-25 NOTE — DISCUSSION/SUMMARY
[FreeTextEntry1] : CAD via Ca score, stress test reviewed. cont ASA and statin cleared for exercise\par SVT history as above\par HLD check labs in 2-3 months\par

## 2022-06-30 ENCOUNTER — APPOINTMENT (OUTPATIENT)
Dept: HEART AND VASCULAR | Facility: CLINIC | Age: 62
End: 2022-06-30

## 2022-06-30 PROCEDURE — 36415 COLL VENOUS BLD VENIPUNCTURE: CPT

## 2022-07-05 ENCOUNTER — APPOINTMENT (OUTPATIENT)
Dept: HEART AND VASCULAR | Facility: CLINIC | Age: 62
End: 2022-07-05

## 2022-07-05 VITALS
BODY MASS INDEX: 20.88 KG/M2 | TEMPERATURE: 98.2 F | SYSTOLIC BLOOD PRESSURE: 130 MMHG | HEART RATE: 62 BPM | WEIGHT: 157.56 LBS | HEIGHT: 73 IN | OXYGEN SATURATION: 97 % | DIASTOLIC BLOOD PRESSURE: 74 MMHG

## 2022-07-05 DIAGNOSIS — R00.2 PALPITATIONS: ICD-10-CM

## 2022-07-05 DIAGNOSIS — I47.1 SUPRAVENTRICULAR TACHYCARDIA: ICD-10-CM

## 2022-07-05 DIAGNOSIS — R93.1 ABNORMAL FINDINGS ON DIAGNOSTIC IMAGING OF HEART AND CORONARY CIRCULATION: ICD-10-CM

## 2022-07-05 LAB
ALBUMIN SERPL ELPH-MCNC: 4.8 G/DL
ALP BLD-CCNC: 43 U/L
ALT SERPL-CCNC: 25 U/L
ANION GAP SERPL CALC-SCNC: 13 MMOL/L
AST SERPL-CCNC: 23 U/L
BILIRUB SERPL-MCNC: 0.8 MG/DL
BUN SERPL-MCNC: 20 MG/DL
CALCIUM SERPL-MCNC: 9.5 MG/DL
CHLORIDE SERPL-SCNC: 101 MMOL/L
CHOLEST SERPL-MCNC: 177 MG/DL
CO2 SERPL-SCNC: 24 MMOL/L
CREAT SERPL-MCNC: 1.1 MG/DL
EGFR: 77 ML/MIN/1.73M2
ESTIMATED AVERAGE GLUCOSE: 114 MG/DL
GLUCOSE SERPL-MCNC: 77 MG/DL
HBA1C MFR BLD HPLC: 5.6 %
HDLC SERPL-MCNC: 73 MG/DL
LDLC SERPL CALC-MCNC: 91 MG/DL
NONHDLC SERPL-MCNC: 104 MG/DL
POTASSIUM SERPL-SCNC: 4.8 MMOL/L
PROT SERPL-MCNC: 7.1 G/DL
SODIUM SERPL-SCNC: 137 MMOL/L
TRIGL SERPL-MCNC: 62 MG/DL

## 2022-07-05 PROCEDURE — 99214 OFFICE O/P EST MOD 30 MIN: CPT

## 2022-07-06 PROBLEM — R93.1 ELEVATED CORONARY ARTERY CALCIUM SCORE: Status: ACTIVE | Noted: 2022-04-18

## 2022-07-06 PROBLEM — I47.1 SVT (SUPRAVENTRICULAR TACHYCARDIA): Status: ACTIVE | Noted: 2019-04-16

## 2022-07-06 RX ORDER — ASPIRIN 81 MG/1
81 TABLET ORAL DAILY
Qty: 90 | Refills: 3 | Status: ACTIVE | COMMUNITY
Start: 2022-04-18 | End: 1900-01-01

## 2022-07-06 NOTE — REASON FOR VISIT
[Symptom and Test Evaluation] : symptom and test evaluation [FreeTextEntry1] : Mr. IVORY presents for a follow up today. He had symptoms of palpitations in the past s/p SVT ablation. He comes in with abnormal Ca score; we are discussing this new data. He had a stress ekg and an echocardiogram, as we are unable to approve a stress echo. We are discussing results. He also completed labs, as we started a statin

## 2022-07-06 NOTE — DISCUSSION/SUMMARY
[FreeTextEntry1] : CAD cont ASA and statin\par HLD reviewed results will send to PMD, refill statin\par Palps/SVT echo and stress reviewed. Inclined towards a conservative follow up in this patient. We had a careful discussion regarding diet and exercise. Will be happy to re-evaluate.\par

## 2022-11-25 NOTE — ED PROVIDER NOTE - SEVERITY
Alert and oriented to person, place and time. Normal mood and affect, no apparent risk to self or others
MODERATE

## 2022-11-30 NOTE — ED ADULT TRIAGE NOTE - MODE OF ARRIVAL
Continued Stay Review    Date: 11/27/22                          Current Patient Class: inpatient  Current Level of Care: med surg  HPI:62 y o  female initially admitted on 11/24/22     Assessment/Plan:   Acute on chronic combined CHF POA  SOB improving, lungs with diminished breath sounds, currently on RA  Restarted back on aldactone daily  Hypokalemia 2nd diuretic use, K drop to 3 3, repletion given       Vital Signs:   11/27/22 0800 97 5 °F (36 4 °C) 87 18 147/84 105 97 % -- -- None (Room air) Lying   11/27/22 07:58:03 97 5 °F (36 4 °C) 90 -- 147/84 105 98 % -- -- -- --     Pertinent Labs/Diagnostic Results:   Results from last 7 days   Lab Units 11/24/22  0842   SARS-COV-2  Negative     Results from last 7 days   Lab Units 11/26/22  0549 11/25/22  0553 11/24/22  1513 11/24/22  0838   WBC Thousand/uL 9 57 6 56  --  11 38*   HEMOGLOBIN g/dL 13 6 13 8  --  14 2   I STAT HEMOGLOBIN g/dl  --   --  13 6  --    HEMATOCRIT % 41 6 41 7  --  46 0   HEMATOCRIT, ISTAT %  --   --  40  --    PLATELETS Thousands/uL 243 249  --  281   NEUTROS ABS Thousands/µL  --  5 45  --  3 78       Results from last 7 days   Lab Units 11/27/22  0538 11/26/22  0549 11/25/22  0553 11/24/22  1513 11/24/22  0838   SODIUM mmol/L 137 141 136  --  141   POTASSIUM mmol/L 3 3* 3 6 3 7  --  4 3   CHLORIDE mmol/L 97 101 97  --  106   CO2 mmol/L 34* 30 31  --  21   CO2, I-STAT mmol/L  --   --   --  34*  --    ANION GAP mmol/L 6 10 8  --  14*   BUN mg/dL 23 15 11  --  12   CREATININE mg/dL 0 79 0 73 0 67  --  0 79   EGFR ml/min/1 73sq m 81 89 95  --  81   CALCIUM mg/dL 8 3 8 6 8 4  --  7 7*   CALCIUM, IONIZED, ISTAT mmol/L  --   --   --  1 05*  --    MAGNESIUM mg/dL  --  1 8 1 6  --   --    PHOSPHORUS mg/dL  --   --  3 4  --   --      Results from last 7 days   Lab Units 11/24/22  0838   AST U/L 104*   ALT U/L 36   ALK PHOS U/L 242*   TOTAL PROTEIN g/dL 6 9   ALBUMIN g/dL 2 7*   TOTAL BILIRUBIN mg/dL 0 60     Results from last 7 days   Lab Units 11/28/22  1125 11/28/22  0709 11/27/22  2111 11/27/22  1626 11/27/22  1142 11/27/22  0805 11/26/22  2102 11/26/22  1622 11/26/22  1157 11/26/22  0736 11/25/22  2257 11/25/22  1555   POC GLUCOSE mg/dl 123 103 127 103 118 169* 100 86 129 108 110 134     Results from last 7 days   Lab Units 11/27/22  0538 11/26/22  0549 11/25/22  0553 11/24/22  0838   GLUCOSE RANDOM mg/dL 101 103 157* 234*       Results from last 7 days   Lab Units 11/24/22  0838   HEMOGLOBIN A1C % 5 8*   EAG mg/dl 120     BETA-HYDROXYBUTYRATE   Date Value Ref Range Status   05/25/2020 0 1 <0 6 mmol/L Final   07/10/2019 0 1 <0 6 mmol/L Final        Results from last 7 days   Lab Units 11/24/22  1105 11/24/22  0832   PH ESTHER  7 270* 7 035*   PCO2 ESTHER mm Hg 74 7* 99 9*   PO2 ESTHER mm Hg 24 5* 50 0*   HCO3 ESTHER mmol/L 33 5* 26 1   BASE EXC ESTHER mmol/L 4 0 -7 5   O2 CONTENT ESTHER ml/dL 7 1 13 8   O2 HGB, VENOUS % 35 1* 62 9     Results from last 7 days   Lab Units 11/24/22  1513   I STAT BASE EXC mmol/L 7*   I STAT O2 SAT % 78   ISTAT PH ART  7 445   I STAT ART PCO2 mm HG 47 5*   I STAT ART PO2 mm HG 41 0*   I STAT ART HCO3 mmol/L 32 6*       Results from last 7 days   Lab Units 11/24/22  1304 11/24/22  1105 11/24/22  0832   HS TNI 0HR ng/L  --   --  19   HS TNI 2HR ng/L  --  38  --    HSTNI D2 ng/L  --  19  --    HS TNI 4HR ng/L 47  --   --    HSTNI D4 ng/L 28*  --   --        Results from last 7 days   Lab Units 11/27/22  0538 11/25/22  0553 11/24/22  0832   PROCALCITONIN ng/ml 0 40* 0 67* 0 07     Results from last 7 days   Lab Units 11/24/22  1501 11/24/22  1304 11/24/22  1105 11/24/22  0832   LACTIC ACID mmol/L 2 5* 2 4* 2 5* 4 7*       Results from last 7 days   Lab Units 11/24/22  0838   NT-PRO BNP pg/mL 3,408*       Results from last 7 days   Lab Units 11/24/22  1540   CLARITY UA  Clear   COLOR UA  Light Yellow   SPEC GRAV UA  1 010   PH UA  6 0   GLUCOSE UA mg/dl Negative   KETONES UA mg/dl Negative   BLOOD UA  Negative   PROTEIN UA mg/dl Negative NITRITE UA  Negative   BILIRUBIN UA  Negative   UROBILINOGEN UA E U /dl 0 2   LEUKOCYTES UA  Negative     Results from last 7 days   Lab Units 11/24/22  1525 11/24/22  0842   STREP PNEUMONIAE ANTIGEN, URINE  Negative  --    LEGIONELLA URINARY ANTIGEN  Negative  --    INFLUENZA A PCR   --  Negative   INFLUENZA B PCR   --  Negative   RSV PCR   --  Negative     Results from last 7 days   Lab Units 11/26/22  1639   AMPH/METH  Negative   BARBITURATE UR  Negative   BENZODIAZEPINE UR  Negative   COCAINE UR  Negative   METHADONE URINE  Positive*   OPIATE UR  Negative   PCP UR  Negative   THC UR  Positive*     Results from last 7 days   Lab Units 11/24/22  0832   ETHANOL LVL mg/dL <3       Results from last 7 days   Lab Units 11/24/22  0850   BLOOD CULTURE  No Growth After 5 Days  No Growth After 5 Days  Medications:   Scheduled Medications:  atorvastatin, 40 mg, Oral, Daily With Dinner  clopidogrel, 75 mg, Oral, Daily  enoxaparin, 40 mg, Subcutaneous, Daily  famotidine, 20 mg, Oral, BID  folic acid, 1 mg, Oral, Daily  insulin lispro, 1-5 Units, Subcutaneous, TID AC  insulin lispro, 1-5 Units, Subcutaneous, HS  ipratropium-albuterol, 3 mL, Nebulization, TID  lisinopril, 5 mg, Oral, Daily  methadone, 45 mg, Oral, Daily  metoprolol succinate, 50 mg, Oral, Daily  multivitamin stress formula, 1 tablet, Oral, Daily  nicotine, 1 patch, Transdermal, Daily  potassium chloride (K-DUR,KLOR-CON) CR tablet 40 mEq, Once  spironolactone (ALDACTONE) tablet 12 5 mg, Daily  thiamine, 100 mg, Oral, Daily    PRN Meds:  hydrOXYzine HCL, 25 mg, Oral, Q6H PRN    Discharge Plan: tbd    Network Utilization Review Department  ATTENTION: Please call with any questions or concerns to [phone] and carefully listen to the prompts so that you are directed to the right person   All voicemails are confidential   [facility] all requests for admission clinical reviews, approved or denied determinations and any other requests to dedicated fax number below belonging to the campus where the patient is receiving treatment   List of dedicated fax numbers for the Facilities:  [address] [place] [address] DENIALS (Administrative/Medical Necessity) [phone]   [address]  (Maternity/NICU/Pediatrics) [phone]   [address] [phone]   [address] [phone]   [address] [address] [facility] [place] [address] [phone]   [facility] [address] [facility] [phone] Walk in

## 2023-01-11 ENCOUNTER — APPOINTMENT (OUTPATIENT)
Dept: HEART AND VASCULAR | Facility: CLINIC | Age: 63
End: 2023-01-11

## 2023-03-28 NOTE — ED ADULT NURSE NOTE - PRO INTERPRETER NEED 2
English Olumiant Pregnancy And Lactation Text: Based on animal studies, Olumiant may cause embryo-fetal harm when administered to pregnant women.  The medication should not be used in pregnancy.  Breastfeeding is not recommended during treatment.

## 2023-07-05 NOTE — ED ADULT TRIAGE NOTE - NSWEIGHTCALCTOOLDRUG_GEN_A_CORE
Detail Level: Detailed Add 1585x Cpt? (Do Not Bill If You Billed For The Procedure Placing The Sutures. This Is An Add-On Code That Must Be Billed With An E/M Visit Code): No  used

## 2023-07-30 ENCOUNTER — RX RENEWAL (OUTPATIENT)
Age: 63
End: 2023-07-30

## 2023-07-30 RX ORDER — ROSUVASTATIN CALCIUM 10 MG/1
10 TABLET, FILM COATED ORAL
Qty: 90 | Refills: 3 | Status: ACTIVE | COMMUNITY
Start: 2022-04-18 | End: 1900-01-01

## 2025-05-03 ENCOUNTER — EMERGENCY (EMERGENCY)
Facility: HOSPITAL | Age: 65
LOS: 1 days | End: 2025-05-03
Attending: EMERGENCY MEDICINE | Admitting: EMERGENCY MEDICINE
Payer: COMMERCIAL

## 2025-05-03 ENCOUNTER — TRANSCRIPTION ENCOUNTER (OUTPATIENT)
Age: 65
End: 2025-05-03

## 2025-05-03 VITALS
WEIGHT: 169.09 LBS | TEMPERATURE: 98 F | RESPIRATION RATE: 18 BRPM | OXYGEN SATURATION: 96 % | SYSTOLIC BLOOD PRESSURE: 150 MMHG | HEART RATE: 79 BPM | DIASTOLIC BLOOD PRESSURE: 83 MMHG

## 2025-05-03 VITALS — HEART RATE: 70 BPM

## 2025-05-03 LAB
ALBUMIN SERPL ELPH-MCNC: 3.5 G/DL — SIGNIFICANT CHANGE UP (ref 3.4–5)
ALP SERPL-CCNC: 45 U/L — SIGNIFICANT CHANGE UP (ref 40–120)
ALT FLD-CCNC: 31 U/L — SIGNIFICANT CHANGE UP (ref 12–42)
ANION GAP SERPL CALC-SCNC: 4 MMOL/L — LOW (ref 9–16)
AST SERPL-CCNC: 25 U/L — SIGNIFICANT CHANGE UP (ref 15–37)
BASOPHILS # BLD AUTO: 0.03 K/UL — SIGNIFICANT CHANGE UP (ref 0–0.2)
BASOPHILS NFR BLD AUTO: 0.4 % — SIGNIFICANT CHANGE UP (ref 0–2)
BILIRUB SERPL-MCNC: 0.6 MG/DL — SIGNIFICANT CHANGE UP (ref 0.2–1.2)
BUN SERPL-MCNC: 9 MG/DL — SIGNIFICANT CHANGE UP (ref 7–23)
CALCIUM SERPL-MCNC: 8.7 MG/DL — SIGNIFICANT CHANGE UP (ref 8.5–10.5)
CHLORIDE SERPL-SCNC: 96 MMOL/L — SIGNIFICANT CHANGE UP (ref 96–108)
CO2 SERPL-SCNC: 27 MMOL/L — SIGNIFICANT CHANGE UP (ref 22–31)
CREAT SERPL-MCNC: 0.95 MG/DL — SIGNIFICANT CHANGE UP (ref 0.5–1.3)
EGFR: 90 ML/MIN/1.73M2 — SIGNIFICANT CHANGE UP
EGFR: 90 ML/MIN/1.73M2 — SIGNIFICANT CHANGE UP
EOSINOPHIL # BLD AUTO: 0.28 K/UL — SIGNIFICANT CHANGE UP (ref 0–0.5)
EOSINOPHIL NFR BLD AUTO: 3.7 % — SIGNIFICANT CHANGE UP (ref 0–6)
FLUAV AG NPH QL: SIGNIFICANT CHANGE UP
FLUBV AG NPH QL: SIGNIFICANT CHANGE UP
GLUCOSE SERPL-MCNC: 99 MG/DL — SIGNIFICANT CHANGE UP (ref 70–99)
HCT VFR BLD CALC: 38.2 % — LOW (ref 39–50)
HGB BLD-MCNC: 13.4 G/DL — SIGNIFICANT CHANGE UP (ref 13–17)
IMM GRANULOCYTES # BLD AUTO: 0.02 K/UL — SIGNIFICANT CHANGE UP (ref 0–0.07)
IMM GRANULOCYTES NFR BLD AUTO: 0.3 % — SIGNIFICANT CHANGE UP (ref 0–0.9)
LYMPHOCYTES # BLD AUTO: 1.77 K/UL — SIGNIFICANT CHANGE UP (ref 1–3.3)
LYMPHOCYTES NFR BLD AUTO: 23.1 % — SIGNIFICANT CHANGE UP (ref 13–44)
MAGNESIUM SERPL-MCNC: 1.8 MG/DL — SIGNIFICANT CHANGE UP (ref 1.6–2.6)
MCHC RBC-ENTMCNC: 32.3 PG — SIGNIFICANT CHANGE UP (ref 27–34)
MCHC RBC-ENTMCNC: 35.1 G/DL — SIGNIFICANT CHANGE UP (ref 32–36)
MCV RBC AUTO: 92 FL — SIGNIFICANT CHANGE UP (ref 80–100)
MONOCYTES # BLD AUTO: 0.88 K/UL — SIGNIFICANT CHANGE UP (ref 0–0.9)
MONOCYTES NFR BLD AUTO: 11.5 % — SIGNIFICANT CHANGE UP (ref 2–14)
NEUTROPHILS # BLD AUTO: 4.68 K/UL — SIGNIFICANT CHANGE UP (ref 1.8–7.4)
NEUTROPHILS NFR BLD AUTO: 61 % — SIGNIFICANT CHANGE UP (ref 43–77)
NRBC # BLD AUTO: 0 K/UL — SIGNIFICANT CHANGE UP (ref 0–0)
NRBC # FLD: 0 K/UL — SIGNIFICANT CHANGE UP (ref 0–0)
NRBC BLD AUTO-RTO: 0 /100 WBCS — SIGNIFICANT CHANGE UP (ref 0–0)
PLATELET # BLD AUTO: 204 K/UL — SIGNIFICANT CHANGE UP (ref 150–400)
PMV BLD: 8.5 FL — SIGNIFICANT CHANGE UP (ref 7–13)
POTASSIUM SERPL-MCNC: 4 MMOL/L — SIGNIFICANT CHANGE UP (ref 3.5–5.3)
POTASSIUM SERPL-SCNC: 4 MMOL/L — SIGNIFICANT CHANGE UP (ref 3.5–5.3)
PROT SERPL-MCNC: 6.9 G/DL — SIGNIFICANT CHANGE UP (ref 6.4–8.2)
RBC # BLD: 4.15 M/UL — LOW (ref 4.2–5.8)
RBC # FLD: 12 % — SIGNIFICANT CHANGE UP (ref 10.3–14.5)
RSV RNA NPH QL NAA+NON-PROBE: SIGNIFICANT CHANGE UP
SARS-COV-2 RNA SPEC QL NAA+PROBE: SIGNIFICANT CHANGE UP
SODIUM SERPL-SCNC: 127 MMOL/L — LOW (ref 132–145)
SOURCE RESPIRATORY: SIGNIFICANT CHANGE UP
TROPONIN I, HIGH SENSITIVITY RESULT: 6.9 NG/L — SIGNIFICANT CHANGE UP
WBC # BLD: 7.66 K/UL — SIGNIFICANT CHANGE UP (ref 3.8–10.5)
WBC # FLD AUTO: 7.66 K/UL — SIGNIFICANT CHANGE UP (ref 3.8–10.5)

## 2025-05-03 PROCEDURE — 99285 EMERGENCY DEPT VISIT HI MDM: CPT

## 2025-05-03 PROCEDURE — 71046 X-RAY EXAM CHEST 2 VIEWS: CPT | Mod: 26

## 2025-05-03 RX ORDER — PREDNISONE 20 MG/1
2 TABLET ORAL
Qty: 10 | Refills: 0
Start: 2025-05-03 | End: 2025-05-07

## 2025-05-03 RX ORDER — IPRATROPIUM BROMIDE AND ALBUTEROL SULFATE .5; 2.5 MG/3ML; MG/3ML
3 SOLUTION RESPIRATORY (INHALATION) ONCE
Refills: 0 | Status: COMPLETED | OUTPATIENT
Start: 2025-05-03 | End: 2025-05-03

## 2025-05-03 RX ORDER — AMOXICILLIN AND CLAVULANATE POTASSIUM 500; 125 MG/1; MG/1
1 TABLET, FILM COATED ORAL ONCE
Refills: 0 | Status: COMPLETED | OUTPATIENT
Start: 2025-05-03 | End: 2025-05-03

## 2025-05-03 RX ORDER — BENZONATATE 100 MG
1 CAPSULE ORAL
Qty: 15 | Refills: 0
Start: 2025-05-03 | End: 2025-05-07

## 2025-05-03 RX ORDER — DEXAMETHASONE 0.5 MG/1
6 TABLET ORAL ONCE
Refills: 0 | Status: COMPLETED | OUTPATIENT
Start: 2025-05-03 | End: 2025-05-03

## 2025-05-03 RX ORDER — BENZONATATE 100 MG
100 CAPSULE ORAL ONCE
Refills: 0 | Status: COMPLETED | OUTPATIENT
Start: 2025-05-03 | End: 2025-05-03

## 2025-05-03 RX ORDER — AMOXICILLIN AND CLAVULANATE POTASSIUM 500; 125 MG/1; MG/1
1 TABLET, FILM COATED ORAL
Qty: 14 | Refills: 0
Start: 2025-05-03 | End: 2025-05-09

## 2025-05-03 RX ORDER — ALBUTEROL SULFATE 2.5 MG/3ML
1 VIAL, NEBULIZER (ML) INHALATION ONCE
Refills: 0 | Status: COMPLETED | OUTPATIENT
Start: 2025-05-03 | End: 2025-05-03

## 2025-05-03 RX ADMIN — IPRATROPIUM BROMIDE AND ALBUTEROL SULFATE 3 MILLILITER(S): .5; 2.5 SOLUTION RESPIRATORY (INHALATION) at 21:11

## 2025-05-03 RX ADMIN — DEXAMETHASONE 6 MILLIGRAM(S): 0.5 TABLET ORAL at 21:11

## 2025-05-03 RX ADMIN — Medication 1 PUFF(S): at 23:02

## 2025-05-03 RX ADMIN — AMOXICILLIN AND CLAVULANATE POTASSIUM 1 TABLET(S): 500; 125 TABLET, FILM COATED ORAL at 23:02

## 2025-05-03 RX ADMIN — Medication 100 MILLIGRAM(S): at 21:11

## 2025-05-03 NOTE — ED PROVIDER NOTE - DISPOSITION TYPE
MEDICINE HISTORY AND PHYSICAL    Patient ID:  Marci Horta  [id number]  86 year old  11/23/1932       Chief Complaint: Dyspnea on exertion, generalized weakness    History of Present Illness:   Patient is a 86 year old  female, history of paroxysmal A. fib on Eliquis, type 2 diabetes, CK D4, hypertension, hypothyroidism, CAD/CABG, tricuspid valve repair, chronic diastolic CHF, pulmonary hypertension, LEXUS on CPAP, obesity who presents to the hospital with dyspnea on exertion and generalized weakness for last 4 days.  Per patient, for last 4 days has been progressively worsening dyspnea on exertion associated with generalized weakness, lack of stamina, but no chest pain, palpitation, dizziness, cough, fever, lower extremity edema, nausea, vomiting, diarrhea, or bleeding.  Unaware of orthopnea as patient does not sleep flat and uses C Pap at night. No PND. No urinary symptoms.  Compliant with medications including Lasix.  Lives at home, ambulates with walker and uses wheelchair for prolonged commute.  No active smoking, excessive alcohol or illicit drugs.    In the emergency room, noted to have A. fib with RVR with elevated blood pressure, no hypoxia.  Chest X a showed mild pulmonary edema. EKGs showed A. fib with RVR without acute ST changes  Blood work showed stable creatinine, no leukocytosis, normal electrolytes, elevated proBNP, negative troponin  Patient received IV Lopressor, but continued to have tachycardia, so was started on Cardizem drip.         Past Medical/ Surgical History: reviewed    Hyperlipidemia                                                Gastroparesis                                                 Unspecified sinusitis (chronic)                               Hypertension                                                  GOITER MULTINODULAR                             01/01/1993    Hypothyroidism                                                DIABETES MELLITUS TYPE I                                         Comment: IDDM    Chronic kidney disease (CKD), stage II (mild)                 Urinary incontinence                                            Comment: Wears pad    DDD lumbar spine                                                Comment: MARTHA x 3    Arthritis                                                       Comment: All joints    S/P CABG x 2                                    5/15/2017     Myocardial infarction (CMS/HCC)                 05/11/2017    Congestive cardiac failure (CMS/HCC)                          Cerebral infarction (CMS/HCC)                                 Chronic pain                                                  Anxiety                                                       Spinal stenosis, lumbar                                       Personal history of colon cancer                2017          Coronary artery disease                                       MRSA (methicillin resistant Staphylococcus aur*               Urinary tract infection                                       Anemia                                                        AF (paroxysmal atrial fibrillation) (CMS/HCC)   05/2018       Sinus node dysfunction (CMS/HCC)                05/2018       THYROID LOBECTOMY,UNILAT                        1993          BLADDER SURGERY                                                 Comment: To help with bladder control    TOTAL HIP REPLACEMENT                           01/12/2012    CARPAL TUNNEL RELEASE                           12/07/2012    TRICUSPID VALVE REPLACEMENT                     05/18/2017      Comment: Ring repair    CARDIAC CATHETERIZATION                         05/11/2017      Comment: 90-95% LAD & Left Circumflex    CATARACT EXTRACTION, BILATERAL                  2000          COLONOSCOPY W/ POLYPECTOMY                      01/31/2011      Comment: 17 polyps    COLONOSCOPY W/ POLYPECTOMY                      12/05/2011      Comment: 9 polyps    TOTAL KNEE REPLACEMENT                           09/18/2013    CARPAL TUNNEL RELEASE                           11/16/2015    TOTAL KNEE REPLACEMENT                          01/11/2017    CORONARY ARTERY BYPASS GRAFT                    05/15/2017    ANKLE FRACTURE SURGERY                          2007          FLEXIBLE SIGMOIDOSCOPY BX                       06/12/2017      Comment: Large rectal polyp, rectal ulcer    COLONOSCOPY W/ POLYPECTOMY                      07/08/2017      Comment: Ascending colon mass, + adenocarcinoma    COLONOSCOPY FLEXIBLE WITH ENDOSCOPIC MUCOSAL R* 07/17/2017      Comment: Endomucosal resection of rectal polyp    PART REMOVAL COLON W ANASTOMOSIS                07/21/2017      Comment: Right hemicolectomy for colon cancer    LUMBAR FUSION                                   2007            Comment: L 3-5    COLONOSCOPY W/ POLYPECTOMY                      02/16/2018      Comment: Multiple polyps removed, mild left-sided                diverticulosis, moderate nonbleeding internal                hemorrhoids, recurrence of polyp at this                polypectomy site in the rectum with complete                removal as noted, recall in 6 months also, Dr. Alessandro Byers    COLONOSCOPY CONTROL BLEEDING                    02/22/2018      Comment: Ulcerations at several sites from previous                polypectomy. 3 clips placed    RIGHT HEART CATH                                04/25/2018      Comment: Pulmonary hypertension    PACEMAKER IMPLANT                               04/26/2018      Comment: [facility]    COLONOSCOPY DIAGNOSTIC                          05/22/2018      Comment: tubular polyp 1yr recall     Allergies: reviewed  Allergies as of 08/20/2019 - Reviewed 08/20/2019   Allergen Reaction Noted   • Latex   (environmental) RASH and SWELLING 01/04/2017   • Propoxyphene n-apap Nausea & Vomiting 09/22/2008   • Adhesive   (environmental) RASH 01/20/2012   • Codeine Nausea & Vomiting     • Enalapril maleate Cough 09/22/2008   • Epsom salt [magnesium sulfate heptahydrate] RASH 05/21/2013   • Morphine Nausea & Vomiting 11/12/2012   • Neurontin [gabapentin] DIZZINESS 09/22/2008   • Oxycodone hcl Nausea & Vomiting 09/22/2017       Home Medications: reviewed  Medications Prior to Admission   Medication Sig Dispense Refill   • hydrALAZINE (APRESOLINE) 100 MG tablet Take 1 tablet by mouth every 8 hours. 270 tablet 3   • Insulin Glargine (LANTUS SC) Inject 25 Units into the skin nightly.     • Insulin Lispro (HUMALOG SC) Inject into the skin 3 times daily (with meals). Inject per sliding scale     • traMADol (ULTRAM) 50 MG tablet TAKE 1 TABLET BY MOUTH 3  TIMES DAILY WITH TYLENOL 90 tablet 1   • potassium CHLORIDE (KLOR-CON M) 20 MEQ mignon ER tablet Take 1 tablet by mouth 2 times daily. 180 tablet 1   • DULoxetine (CYMBALTA) 30 MG capsule TAKE ONE CAPSULE BY MOUTH EVERY DAY WITH BREAKFAST 90 capsule 3   • apixaBAN (ELIQUIS) 5 MG Tab Take 1 tablet by mouth every 12 hours. Do not start before June 1, 2019. 60 tablet 5   • levothyroxine (SYNTHROID, LEVOTHROID) 112 MCG tablet Take 1 tablet by mouth daily. 90 tablet 1   • metoPROLOL tartrate (LOPRESSOR) 50 MG tablet Take 1 tablet by mouth every 12 hours. 180 tablet 1   • furosemide (LASIX) 40 MG tablet Take 1 tablet by mouth 2 times daily. 180 tablet 3   • atorvastatin (LIPITOR) 40 MG tablet Take 1 tablet by mouth nightly. 90 tablet 3   • famotidine (PEPCID) 20 MG tablet Take 1 tablet by mouth daily. 90 tablet 3   • acetaminophen (TYLENOL) 500 MG tablet Take 1,000 mg by mouth 4 times daily. Dose: 2 tabs (=1,000mg)     • [DISCONTINUED] ELIQUIS 5 MG Tab TAKE 1 TABLET BY MOUTH EVERY 12 HOURS 60 tablet 11   • ferrous sulfate 325 (65 FE) MG tablet TAKE 1 TABLET BY MOUTH TWICE A DAY WITH MEALS 180 tablet 3   • LORazepam (ATIVAN) 0.5 MG tablet Take 1 tablet by mouth every 8 hours as needed for Anxiety. 90 tablet 1   • vitamin D, Cholecalciferol, 1000 units capsule  Take 1 capsule by mouth daily. 60 capsule 0   • Emollient (AQUAPHOR) ointment Apply topically daily. 420 g 0       Family History: reviewed   Family History   Problem Relation Age of Onset   • Diabetes Mother    • Heart disease Mother    • Arthritis Father    • Diabetes Grandchild    • Hypertension Other         Family member not identified   • Heart Other         CAD, Family member not identified   • Arthritis Sister    • Arthritis Brother    • Stroke Brother    • Arthritis Sister    • Cancer Sister         ovary/uterus   • Arthritis Sister    • Arthritis Brother    • Heart disease Brother    • Arthritis Brother    • Arthritis Brother        Social History: reviewed  Social History     Socioeconomic History   • Marital status: /Civil Union     Spouse name: Wyatt Horta   • Number of children: 5   • Years of education: Not on file   • Highest education level: Not on file   Occupational History   • Occupation:      Comment: Retired   Social Needs   • Financial resource strain: Not on file   • Food insecurity:     Worry: Not on file     Inability: Not on file   • Transportation needs:     Medical: Not on file     Non-medical: Not on file   Tobacco Use   • Smoking status: Never Smoker   • Smokeless tobacco: Never Used   Substance and Sexual Activity   • Alcohol use: No     Frequency: Never     Drinks per session: 1 or 2     Binge frequency: Never   • Drug use: No   • Sexual activity: Not on file   Lifestyle   • Physical activity:     Days per week: Not on file     Minutes per session: Not on file   • Stress: Not on file   Relationships   • Social connections:     Talks on phone: Not on file     Gets together: Not on file     Attends [employer] service: Not on file     Active member of club or organization: Not on file     Attends meetings of clubs or organizations: Not on file     Relationship status: Not on file   • Intimate partner violence:     Fear of current or ex partner: Not on file     Emotionally  abused: Not on file     Physically abused: Not on file     Forced sexual activity: Not on file   Other Topics Concern   •  Service Not Asked   • Blood Transfusions Not Asked   • Caffeine Concern Not Asked   • Occupational Exposure Not Asked   • Hobby Hazards Not Asked   • Sleep Concern Not Asked   • Stress Concern Not Asked   • Weight Concern Not Asked   • Special Diet Not Asked   • Back Care Not Asked   • Exercise Not Asked   • Bike Helmet Not Asked   • Seat Belt Yes   • Self-Exams Not Asked   Social History Narrative   • Not on file         Review of Systems:  Total 10 point systems has been reviewed, and are negative other than what mentioned in HPI and above.      Physical Exam:     Vital Signs:    Visit Vitals  BP (!) 195/99 (BP Location: [facility], Patient Position: Semi-Alvarez's)   Pulse 109   Temp 98 °F (36.7 °C) (Oral)   Resp 20   Ht 5' 3\" (1.6 m)   Wt 86.6 kg   SpO2 97%   BMI 33.82 kg/m²    No intake/output data recorded.      GENERAL: Alert, awake and oriented to place, person and time. Not in acute distress  HEAD: Appears to be atraumatic and normocephalic   EYES: No conjunctival pallor. No scleral icterus. Pupils   equal, round and reactive to light.  Extraocular movements appear to be intact  THROAT: Oropharyngeal exam reveals moist oral mucosa. No oral lesion or lip lesion.  NECK: There is JVD. No Lymphadenopathy in anterior cervical, posterior cervical, supraclavicular and submandibular region. Trachea midline. No thyromegaly.  SKIN: Warm and moist. No rash.  LUNGS: Reveals good effort. Bibasilar crackles present. No wheezing   HEART: S1 normal, S2 normal. Irregularly irregular rhythm, tachycardic.. Murmur present.  ABDOMEN:  Normoactive bowel sounds. Soft, non tender and nondistended. There is no rebound tenderness.  There is no hepatosplenomegaly   EXTREMITIES: No clubbing, cyanosis or edema  NEUROLOGIC: Cranial nerves II through XII appear grossly intact. Sensation is intact. No focal  neurological deficits.  PSYCH:  Mood and affect are appropriate.      Labs: reviewed  Lab Results   Component Value Date    SODIUM 140 08/16/2019    POTASSIUM 4.6 08/16/2019    CHLORIDE 103 08/16/2019    CO2 26 08/16/2019    GLUCOSE 151 (H) 08/16/2019    BUN 47 (H) 08/16/2019    CREATININE 1.85 (H) 08/16/2019    ALBUMIN 3.4 (L) 10/18/2018    BILIRUBIN 0.5 10/18/2018    LACTA 0.8 07/09/2017    AST 42 (H) 10/18/2018    PHOS 4.5 08/16/2019    INR 1.1 08/08/2018     Lab Results   Component Value Date    PTT 31 (H) 08/08/2018    WBC 10.8 08/20/2019    HGB 11.3 (L) 08/20/2019    HCT 35.0 (L) 08/20/2019     08/20/2019    BAND 5 06/14/2017    BNP 1,191 (H) 04/23/2018    RAPDTR 0.16 (HH) 08/22/2017     07/17/2012    CRP 3.8 (H) 07/03/2017    TSH 0.695 08/16/2019    MARYURI 13 08/09/2018    UOSM 367 08/09/2018       Diagnostics: EKG reviewed personally showed A. fib with RVR, baseline RBBB, no new acute ischemic changes  XR CHEST AP OR PA - PORTABLE   Final Result   FINDINGS/IMPRESSION:      There is a left-sided pacer with leads entering the heart. Patient has   undergone sternotomy. There is mild cardiomegaly. There is mild pulmonary   vascular congestion, unchanged.      No lobar infiltrate. No pneumothorax.      There is mild opacity at the lateral left lung base which could be due to a   pleural effusion or pleural thickening. This appears unchanged.                 ASSESSMENT AND PLAN:    #1 A. fib with RVR:: With symptoms of dyspnea on exertion and generalized weakness.   - Continue to be tachycardic after IV Lopressor, Cardizem drip started, which will be continued.  - Resume home medication of Lopressor and Eliquis. Telemetry monitoring. EP consulted from ER    #2 acute on chronic diastolic CHF exacerbation, pulmonary hypertension:: Last echo done last year showed preserved EF. Mild pulmonary edema on chest x-ray. Nightly triggered by uncontrolled A. Fib  - IV Lasix, low-sodium diet. Monitor renal function.  Telemetry    #3 . Type 2 diabetes: : Did have hypoglycemic episode in ER, due to reduced oral intake Today. Hold Lantus tonight, resume from tomorrow, with decreased dose. Insulin sliding scale, diabetic diet    #4. CAD/CABG:: No chest pain or EKG changes. Resume home medication of Lopressor, Lipitor    #5. CK D4: Creatinine at baseline. Monitor while being diuresed    #6 hypertension, dyslipidemia: Resume home meds. IV hydralazine as needed    #7. Obstructive sleep apnea: CPAP while sleeping    DVT Prophylaxis: SCDs. On Eliquis    Code status: Selective Treatment/DNR    Is the patient expected to require at least a two midnight stay in the hospital? Yes -  I certify that I expect inpatient services for greater than two midnights are medically necessary for this patient.  Please see H&P and MD Progress Notes for additional information about the patient's course of treatment.    CODE STATUS discussed with patient, wishes to be DO NOT RESUSCITATE    Liz Ruelas MD  [facility] Hospitalist    May need to switch admission status to inpatient, ER admitted as observation    8/20/2019 11:28 PM                   DISCHARGE

## 2025-05-03 NOTE — ED ADULT NURSE NOTE - NSFALLUNIVINTERV_ED_ALL_ED
Bed/Stretcher in lowest position, wheels locked, appropriate side rails in place/Call bell, personal items and telephone in reach/Instruct patient to call for assistance before getting out of bed/chair/stretcher/Non-slip footwear applied when patient is off stretcher/[place] to call system/Physically safe environment - no spills, clutter or unnecessary equipment/Purposeful proactive rounding/Room/bathroom lighting operational, light cord in reach

## 2025-05-03 NOTE — ED ADULT NURSE NOTE - OBJECTIVE STATEMENT
Pt presents to ED c/o throat pain, headache and jaw pain since tuesday, waking up with night sweats and fevers at home. No sick contacts at home. Hx: HLD. GCS 15.

## 2025-05-03 NOTE — ED ADULT NURSE NOTE - CAS DISCH CONDITION
Refill passed per [facility] protocol.  Requested Prescriptions   Pending Prescriptions Disp Refills    RIZATRIPTAN BENZOATE 10 MG Oral Tab [Pharmacy Med Name: RIZATRIPTAN 10MG TABLETS] 8 tablet 2     Sig: TAKE 1 TABLET BY MOUTH AS NEEDED       Neurology Medications Passed - 6/10/2024  9:34 AM        Passed - In person appointment or virtual visit in the past 6 mos or appointment in next 3 mos     Recent Outpatient Visits              5 months ago Routine general medical examination at a health care facility    [address] Ellie Polanco MD    Office Visit    8 months ago Seasonal allergic rhinitis due to pollen    [address] Ellie Wray MD    Office Visit    9 months ago Encounter for therapeutic drug level monitoring    [address] Laura Love PA-C    Telemedicine    11 months ago Mixed hyperlipidemia    [address] Ellie Wray MD    Office Visit    1 year ago Mixed hyperlipidemia    [facility], [address] [place] [address]    Nurse Only          Future Appointments         Provider Department Appt Notes    In 5 days REF BK RD [facility], [address] [place] [address] Lab    In 2 weeks Laura Love PA-C [facility], [address] [place] [address] Follow up    In 1 month Andrew Bianchi DO [facility],  [facility] 5/29/24 NPOV GERD 7/16/24 8a w/ Dr. Tash Dunn    In 1 month Ellie Wray MD [address] 6 month follow up                       Recent Outpatient Visits              5 months ago Routine general medical examination at a health care facility    [address]    Office Visit    8 months ago Seasonal allergic rhinitis due to pollen    [facility]  [facility], [address] [place] [address] Ellie Wray MD    Office Visit    9 months ago Encounter for therapeutic drug level monitoring    [facility], [address] [place] [address] Laura Love PA-C    Telemedicine    11 months ago Mixed hyperlipidemia    [facility], [address] [place] [address] Ellie Wray MD    Office Visit    1 year ago Mixed hyperlipidemia    [facility], [address] [place] [address]    Nurse Only          Future Appointments         Provider Department Appt Notes    In 5 days REF BK RD [facility], [address] [place] [address] Lab    In 2 weeks Laura Love PA-C [facility], [address] [place] [address] Follow up    In 1 month Andrew Bianchi DO [facility],  [facility] 5/29/24 NPOV GERD 7/16/24 8a w/ Dr. Tash Dunn    In 1 month Ellie Wray MD [address] 6 month follow up             Stable

## 2025-05-03 NOTE — ED PROVIDER NOTE - NSFOLLOWUPINSTRUCTIONS_ED_ALL_ED_FT
Acute Bronchitis    WHAT YOU NEED TO KNOW:    What is acute bronchitis? Acute bronchitis is swelling and irritation in your lungs. It is usually caused by a virus and most often happens in the winter. Bronchitis may also be caused by bacteria or by a chemical irritant, such as smoke.    What are the signs and symptoms of acute bronchitis?    Cough that lasts up to 3 weeks    Runny or stuffy nose    Hoarseness, sore throat    Fever    Feeling more tired than usual, and body aches    Wheezing or pain when you breathe or cough  How is acute bronchitis treated? You may need any of the following:    Cough suppressants decrease your urge to cough.    Decongestants help loosen mucus in your lungs and make it easier to cough up. This can help you breathe easier.    Inhalers may be given. Your healthcare provider may give you one or more inhalers to help you breathe easier and cough less. An inhaler gives you medicine to open your airways. Ask your healthcare provider to show you how to use your inhaler correctly.  Metered Dose Inhaler      Antiviral medicine treats infections caused by a virus.    Antibiotics may be given if your bronchitis is caused by bacteria or if you have lung condition.    Acetaminophen decreases pain and fever. It is available without a doctor's order. Ask how much to take and how often to take it. Follow directions. Read the labels of all other medicines you are using to see if they also contain acetaminophen, or ask your doctor or pharmacist. Acetaminophen can cause liver damage if not taken correctly.    NSAIDs help decrease swelling and pain or fever. This medicine is available with or without a doctor's order. NSAIDs can cause stomach bleeding or kidney problems in certain people. If you take blood thinner medicine, always ask your healthcare provider if NSAIDs are safe for you. Always read the medicine label and follow directions.  How can I manage my symptoms?    Drink liquids as directed. You may need to drink more liquids than usual to stay hydrated. Ask how much liquid to drink each day and which liquids are best for you.    Use a cool mist humidifier. This increases air moisture in your home. This may make it easier for you to breathe and help decrease your cough.  Humidifier      Get more rest. Rest helps your body to heal. Slowly start to do more each day. Rest when you feel it is needed.  How can I help prevent acute bronchitis?      Ask about vaccines you may need. Get a flu vaccine each year as soon as recommended, usually in September or October. Ask your healthcare provider if you should also get a pneumonia or COVID-19 vaccine. Your healthcare provider can tell you if you should also get other vaccines, and when to get them.    Prevent the spread of germs.  Wash your hands often with soap and water. Carry germ-killing hand lotion or gel with you. You can use the lotion or gel to clean your hands when soap and water are not available.  Handwashing      Do not touch your eyes, nose, or mouth unless you have washed your hands first.    Always cover your mouth when you cough to prevent the spread of germs. It is best to cough into a tissue or your shirt sleeve instead of into your hand. Ask those around you to cover their mouths when they cough.    Try to avoid people who have a cold or the flu. If you are sick, stay away from others as much as possible.    Avoid irritants in the air. Avoid chemicals, fumes, and dust. Wear a face mask if you must work around dust or fumes. Stay inside on days when air pollution levels are high. If you have allergies, stay inside when pollen counts are high. Do not use aerosol products, such as spray-on deodorant, bug spray, and hair spray.    Do not smoke or be around others who are smoking. Nicotine and other chemicals in cigarettes and cigars can cause lung damage. Ask your healthcare provider for information if you currently smoke and need help to quit. E-cigarettes or smokeless tobacco still contain nicotine. Talk to your healthcare provider before you use these products.  When should I seek immediate care?    You cough up blood.    Your lips or fingernails turn blue.    You feel like you are not getting enough air when you breathe.  When should I call my doctor?    Your symptoms do not go away or get worse, even after treatment.    Your cough does not get better within 4 weeks.    You have questions or concerns about your condition or care.  CARE AGREEMENT:    You have the right to help plan your care. Learn about your health condition and how it may be treated. Discuss treatment options with your healthcare providers to decide what care you want to receive. You always have the right to refuse treatment.

## 2025-05-03 NOTE — ED PROVIDER NOTE - PATIENT PORTAL LINK FT
You can access the FollowMyHealth Patient Portal offered by [facility] by registering at the following website: [email or web address]. By joining [employer]’s FollowMyHealth portal, you will also be able to view your health information using other applications (apps) compatible with our system.

## 2025-05-03 NOTE — ED PROVIDER NOTE - PRINCIPAL DIAGNOSIS
Bronchitis

## 2025-05-03 NOTE — ED PROVIDER NOTE - CLINICAL SUMMARY MEDICAL DECISION MAKING FREE TEXT BOX
64-year-old male presents for cough.  PE as above.    Labs, chest x-ray, symptomatic control, reassess.

## 2025-05-03 NOTE — ED PROVIDER NOTE - PROGRESS NOTE DETAILS
Labs are unremarkable.  Flu/COVID/RSV negative.  Chest x-ray is clear.  Symptoms likely secondary to bronchitis.  Given subjective fevers and symptoms will discharge with Augmentin, prednisone, albuterol, Tessalon.  Follow-up with primary care doctor.  Return precautions discussed.

## 2025-05-07 DIAGNOSIS — E78.5 HYPERLIPIDEMIA, UNSPECIFIED: ICD-10-CM

## 2025-05-07 DIAGNOSIS — J40 BRONCHITIS, NOT SPECIFIED AS ACUTE OR CHRONIC: ICD-10-CM

## 2025-05-07 DIAGNOSIS — R05.1 ACUTE COUGH: ICD-10-CM
